# Patient Record
Sex: FEMALE | Race: BLACK OR AFRICAN AMERICAN | NOT HISPANIC OR LATINO | URBAN - METROPOLITAN AREA
[De-identification: names, ages, dates, MRNs, and addresses within clinical notes are randomized per-mention and may not be internally consistent; named-entity substitution may affect disease eponyms.]

---

## 2022-05-12 ENCOUNTER — EMERGENCY (EMERGENCY)
Facility: HOSPITAL | Age: 47
LOS: 0 days | Discharge: HOME | End: 2022-05-12
Attending: EMERGENCY MEDICINE
Payer: MEDICAID

## 2022-05-12 VITALS
TEMPERATURE: 98 F | HEART RATE: 58 BPM | DIASTOLIC BLOOD PRESSURE: 81 MMHG | RESPIRATION RATE: 18 BRPM | OXYGEN SATURATION: 99 % | SYSTOLIC BLOOD PRESSURE: 124 MMHG

## 2022-05-12 VITALS
TEMPERATURE: 98 F | DIASTOLIC BLOOD PRESSURE: 73 MMHG | OXYGEN SATURATION: 99 % | HEART RATE: 70 BPM | RESPIRATION RATE: 18 BRPM | SYSTOLIC BLOOD PRESSURE: 140 MMHG | WEIGHT: 199.96 LBS | HEIGHT: 71 IN
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
## 2022-05-12 DIAGNOSIS — Z23 ENCOUNTER FOR IMMUNIZATION: ICD-10-CM

## 2022-05-12 DIAGNOSIS — S51.831A PUNCTURE WOUND WITHOUT FOREIGN BODY OF RIGHT FOREARM, INITIAL ENCOUNTER: ICD-10-CM

## 2022-05-12 DIAGNOSIS — S41.111A LACERATION WITHOUT FOREIGN BODY OF RIGHT UPPER ARM, INITIAL ENCOUNTER: ICD-10-CM
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
## 2022-05-12 PROCEDURE — 73090 X-RAY EXAM OF FOREARM: CPT | Mod: 26,RT

## 2022-05-12 PROCEDURE — 99284 EMERGENCY DEPT VISIT MOD MDM: CPT

## 2022-05-12 RX ORDER — RABIES VACC, HUMAN DIPLOID/PF 2.5 UNIT
1 VIAL (EA) INTRAMUSCULAR ONCE
Refills: 0 | Status: COMPLETED | OUTPATIENT
Start: 2022-05-12 | End: 2022-05-12

## 2022-05-12 RX ORDER — TETANUS TOXOID, REDUCED DIPHTHERIA TOXOID AND ACELLULAR PERTUSSIS VACCINE, ADSORBED 5; 2.5; 8; 8; 2.5 [IU]/.5ML; [IU]/.5ML; UG/.5ML; UG/.5ML; UG/.5ML
0.5 SUSPENSION INTRAMUSCULAR ONCE
Refills: 0 | Status: COMPLETED | OUTPATIENT
Start: 2022-05-12 | End: 2022-05-12

## 2022-05-12 RX ORDER — HUMAN RABIES VIRUS IMMUNE GLOBULIN 150 [IU]/ML
1800 INJECTION, SOLUTION INTRAMUSCULAR ONCE
Refills: 0 | Status: COMPLETED | OUTPATIENT
Start: 2022-05-12 | End: 2022-05-12

## 2022-05-12 RX ADMIN — Medication 1 MILLILITER(S): at 22:37

## 2022-05-12 RX ADMIN — TETANUS TOXOID, REDUCED DIPHTHERIA TOXOID AND ACELLULAR PERTUSSIS VACCINE, ADSORBED 0.5 MILLILITER(S): 5; 2.5; 8; 8; 2.5 SUSPENSION INTRAMUSCULAR at 22:39

## 2022-05-12 RX ADMIN — HUMAN RABIES VIRUS IMMUNE GLOBULIN 1800 UNIT(S): 150 INJECTION, SOLUTION INTRAMUSCULAR at 22:37

## 2022-05-12 NOTE — ED ADULT NURSE NOTE - NS_SISCREENINGSR_GEN_ALL_ED
FAMILY HISTORY:  Mother  Still living? Unknown  FHx: diabetes mellitus, Age at diagnosis: Age Unknown  FHx: hypertension, Age at diagnosis: Age Unknown     Negative

## 2022-05-12 NOTE — ED PROVIDER NOTE - PATIENT PORTAL LINK FT
You can access the FollowMyHealth Patient Portal offered by [facility] by registering at the following website: [email or web address]. By joining [employer]’s FollowMyHealth portal, you will also be able to view your health information using other applications (apps) compatible with our system.

## 2022-05-12 NOTE — ED ADULT NURSE NOTE - IS THE PATIENT ABLE TO BE SCREENED?
Yes Continue medications as instructed, follow up with PMD, cardiology and neurology. Return to ED for worsening complaints. Follow up with outpatient drug rehab program for opioid dependence.
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
## 2022-05-12 NOTE — ED ADULT TRIAGE NOTE - WILL THE PATIENT ACCEPT THE PFIZER COVID-19 VACCINE IF ELIGIBLE AND IT IS AVAILABLE?
Per RF- he filled med for her. She does have a follow up with [facility].  Serena Chauhan, [facility]     No

## 2022-05-12 NOTE — ED PROVIDER NOTE - PHYSICAL EXAMINATION
Burks:  General: No distress.  Mentation at baseline.   HEENT: WNL  Chest/Lungs: CTAB, No wheeze, No retractions, No increased work of breathing, Normal rate  Heart: S1S2 RRR, No M/R/G, Pules equal Bilaterally in upper and lower extremities distally  Abd: soft, NT/ND, No guarding, No rebound.  No hernias, no palpable masses.  Extrem: FROM in all joints, no significant edema noted, No ulcers.  Cap refil < 2sec. two puncture wounds to right forarm  Skin: No rash noted, warm dry.  Neuro:  Grossly normal.  No difficulty ambulating. No focal deficits.  Psychiatric: No evidence of delusions. No SI/HI.

## 2022-05-15 ENCOUNTER — EMERGENCY (EMERGENCY)
Facility: HOSPITAL | Age: 47
LOS: 0 days | Discharge: HOME | End: 2022-05-15
Payer: MEDICAID

## 2022-05-15 VITALS
DIASTOLIC BLOOD PRESSURE: 84 MMHG | TEMPERATURE: 98 F | HEART RATE: 72 BPM | WEIGHT: 201.94 LBS | SYSTOLIC BLOOD PRESSURE: 136 MMHG | OXYGEN SATURATION: 98 % | RESPIRATION RATE: 14 BRPM | HEIGHT: 71 IN

## 2022-05-15 DIAGNOSIS — Z29.14 ENCOUNTER FOR PROPHYLACTIC RABIES IMMUNE GLOBIN: ICD-10-CM

## 2022-05-15 PROCEDURE — 99283 EMERGENCY DEPT VISIT LOW MDM: CPT

## 2022-05-15 RX ORDER — RABIES VACC, HUMAN DIPLOID/PF 2.5 UNIT
1 VIAL (EA) INTRAMUSCULAR ONCE
Refills: 0 | Status: COMPLETED | OUTPATIENT
Start: 2022-05-15 | End: 2022-05-15

## 2022-05-15 RX ADMIN — Medication 1 MILLILITER(S): at 10:45

## 2022-05-15 NOTE — ED PROVIDER NOTE - CLINICAL SUMMARY MEDICAL DECISION MAKING FREE TEXT BOX
45 y/o no pmhx presents for second follow-up of rabies vaccine. patient with healing puncture wound from dog bite to right forearm. denies fever, chills, new swelling, decreased range of motion. no other complaints. -- patient is well appearing. will order rabies vaccine and follow-up for 3rd vaccine on 5/19/22

## 2022-05-15 NOTE — ED PROVIDER NOTE - PATIENT PORTAL LINK FT
You can access the FollowMyHealth Patient Portal offered by [facility] by registering at the following website: [email or web address]. By joining [employer]’s FollowMyHealth portal, you will also be able to view your health information using other applications (apps) compatible with our system.

## 2022-05-15 NOTE — ED ADULT NURSE NOTE - OBJECTIVE STATEMENT
46yf presents for follow3 up rabies vaccine. States she was bit by a dog on 5/12/22, received first shot the same day. Reports some pain to right forearm, 6/10. Wound clean and intact. No infection, drainage, warmth. Aox4. Ambulatory. Spont breathing on room air. NAD

## 2022-05-15 NOTE — ED PROVIDER NOTE - PHYSICAL EXAMINATION
Gen: Awake, Alert, NAD  Head:  NC/AT  Eyes:  PERRL, EOMI, Conjunctiva pink, lids normal, no scleral icterus  ENT: OP clear, no exudates, moist mucus membranes  Neck: supple, nontender, no meningismus, no JVD, trachea midline  Cardiac/CV:  S1 S2, RRR, no M/G/R  Respiratory/Pulm:  CTAB, good air movement, normal resp effort, no wheezes/stridor/retractions/rales/rhonchi  Gastrointestinal/Abdomen:  Soft, non tender , nondistended, +BS, no rebound/guarding  Back:  no CVAT,  no MLT  Ext:  warm, well perfused, moving all extremities spontaneously, no peripheral edema, distal pulses intact  Skin: healing puncture wound to posterior aspect of arm. mild erythema, no swelling. full ROM   Neuro:  AAOx3, sensation intact, motor 5/5 x 4 extremities, normal gait, speech clear

## 2022-05-15 NOTE — ED PROVIDER NOTE - NSFOLLOWUPINSTRUCTIONS_ED_ALL_ED_FT
Advance activity as tolerated.  Continue all previously prescribed medications as directed unless otherwise instructed.  Follow up with your primary care physician in 48-72 hours- bring copies of your results.  Return to the ER for worsening or persistent symptoms, and/or ANY NEW OR CONCERNING SYMPTOMS. If you have issues obtaining follow up, please call: [phone] to obtain a doctor or specialist who takes your insurance in your area.  You may call [phone] to make an appointment with the internal medicine clinic.     Return to the ED for your next vaccine on 5/19/22

## 2022-05-15 NOTE — ED PROVIDER NOTE - PROGRESS NOTE DETAILS
Supervising Statement (CAMERON Patrick): I have personally seen and examined this patient.  I have fully participated in the care of this patient. I have reviewed all pertinent clinical information, including history, physical exam, plan and the ACP Fellow's note and agree except as noted.

## 2022-05-15 NOTE — ED PROVIDER NOTE - OBJECTIVE STATEMENT
45 y/o no pmhx presents for second follow-up of rabies vaccine. patient with healing puncture wound from dog bite to right forearm. denies fever, chills, new swelling, decreased range of motion. no other complaints.

## 2022-05-19 ENCOUNTER — EMERGENCY (EMERGENCY)
Facility: HOSPITAL | Age: 47
LOS: 0 days | Discharge: HOME | End: 2022-05-19
Attending: STUDENT IN AN ORGANIZED HEALTH CARE EDUCATION/TRAINING PROGRAM
Payer: MEDICAID

## 2022-05-19 VITALS
WEIGHT: 201.06 LBS | RESPIRATION RATE: 18 BRPM | OXYGEN SATURATION: 99 % | SYSTOLIC BLOOD PRESSURE: 126 MMHG | TEMPERATURE: 98 F | DIASTOLIC BLOOD PRESSURE: 82 MMHG | HEART RATE: 63 BPM | HEIGHT: 71 IN

## 2022-05-19 DIAGNOSIS — Z20.3 CONTACT WITH AND (SUSPECTED) EXPOSURE TO RABIES: ICD-10-CM

## 2022-05-19 DIAGNOSIS — Z23 ENCOUNTER FOR IMMUNIZATION: ICD-10-CM

## 2022-05-19 PROBLEM — J45.909 UNSPECIFIED ASTHMA, UNCOMPLICATED: Chronic | Status: ACTIVE | Noted: 2022-05-15

## 2022-05-19 PROCEDURE — 99282 EMERGENCY DEPT VISIT SF MDM: CPT

## 2022-05-19 RX ORDER — RABIES VACC, HUMAN DIPLOID/PF 2.5 UNIT
1 VIAL (EA) INTRAMUSCULAR ONCE
Refills: 0 | Status: DISCONTINUED | OUTPATIENT
Start: 2022-05-19 | End: 2022-05-19

## 2022-05-19 NOTE — ED PROVIDER NOTE - NS ED ATTENDING STATEMENT MOD
This was a shared visit with the LILIAN. I reviewed and verified the documentation and independently performed the documented:

## 2022-05-19 NOTE — ED ADULT TRIAGE NOTE - GLASGOW COMA SCALE: SCORE, MLM
15 PO intake >75% meals in house. Denies any GI issues (nausea/vomiting/diarrhea/constipation.) Denies any chewing or swallowing difficulties at this time. NKFA. Pt with hx alcohol use. Discussed effects of purine and alcohol intake on gout flare. Also educated pt on heart failure, low Na diet (nutrition label reading, 2 mg limit, adding more fruits and vegetables.)    Pt reports intentional wt loss due to eating better at rehab facility after fall.

## 2022-05-19 NOTE — ED PROVIDER NOTE - PHYSICAL EXAMINATION
Gen: Awake, Alert, NAD  ENT: OP clear, no exudates, moist mucus membranes  Cardiac/CV:  S1 S2, RRR, no M/G/R  Respiratory/Pulm:  CTAB, good air movement, normal resp effort, no wheezes/stridor/retractions/rales/rhonchi  Ext:  warm, well perfused, moving all extremities spontaneously, no peripheral edema, distal pulses intact  Skin: intact, no rash  Neuro:  AAOx3, sensation intact, motor 5/5 x 4 extremities, normal gait, speech clear

## 2022-05-19 NOTE — ED PROVIDER NOTE - CLINICAL SUMMARY MEDICAL DECISION MAKING FREE TEXT BOX
45 y/o F pmhx asthma here for 3rd rabies vaccine after dog bite to right arm. patient is well appearing with no complaints. bite to right forearm healing well. denies fever, chills. -- will give 3rd dose and patient to return for last dose of series on 5/26

## 2022-05-19 NOTE — ED PROVIDER NOTE - OBJECTIVE STATEMENT
45 y/o F pmhx asthma here for 3rd rabies vaccine after dog bite to right arm. patient is well appearing with no complaints. bite to right forearm healing well. denies fever, chills.

## 2022-06-02 ENCOUNTER — EMERGENCY (EMERGENCY)
Facility: HOSPITAL | Age: 47
LOS: 0 days | Discharge: HOME | End: 2022-06-02
Attending: STUDENT IN AN ORGANIZED HEALTH CARE EDUCATION/TRAINING PROGRAM
Payer: MEDICAID

## 2022-06-02 VITALS
HEIGHT: 71 IN | SYSTOLIC BLOOD PRESSURE: 127 MMHG | RESPIRATION RATE: 18 BRPM | HEART RATE: 53 BPM | OXYGEN SATURATION: 98 % | WEIGHT: 145.06 LBS | TEMPERATURE: 98 F | DIASTOLIC BLOOD PRESSURE: 68 MMHG

## 2022-06-02 DIAGNOSIS — Z23 ENCOUNTER FOR IMMUNIZATION: ICD-10-CM

## 2022-06-02 PROCEDURE — 99283 EMERGENCY DEPT VISIT LOW MDM: CPT

## 2022-06-02 RX ORDER — RABIES VACC, HUMAN DIPLOID/PF 2.5 UNIT
1 VIAL (EA) INTRAMUSCULAR ONCE
Refills: 0 | Status: COMPLETED | OUTPATIENT
Start: 2022-06-02 | End: 2022-06-02

## 2022-06-02 RX ADMIN — Medication 1 MILLILITER(S): at 06:20

## 2022-06-02 NOTE — ED ADULT TRIAGE NOTE - IDEAL BODY WEIGHT(KG)
Stable. Currently using inhalers as prescribed.  She denies side effects.  Denies recent or current exacerbation.   Denies current shortness of breath, chest pain, or cough.  She is on oxygen therapy.       71

## 2022-06-02 NOTE — ED PROVIDER NOTE - OBJECTIVE STATEMENT
46F presenting with last dose of rabies vaccination. otherwise no other complaints. Denies any chest pain, abdominal pain, shortness of breath, nausea/vomiting, headaches, fevers, chills, diarrhea ,constipation, weakness, syncope, hematuria, dysuria, urinary symptoms, subjective neurological deficits.

## 2022-06-02 NOTE — ED ADULT TRIAGE NOTE - BEFAST EYES
Normocytic, normochromic.  No baseline on file but has been told in the past that she was anemic and has taken iron supplements in the past.  She denies obvious observed bleeding.  Low Iron studies. Start ferrous sulfate   No

## 2022-06-02 NOTE — ED PROVIDER NOTE - PHYSICAL EXAMINATION
VITAL SIGNS: I have reviewed nursing notes and confirm.   GEN: Well-developed; well-nourished; in no acute distress. Speaking full sentences.  SKIN: Warm, pink, no rash, no diaphoresis, no cyanosis, well perfused. EXCEPT: (+) healed right forearm circular scarring x 2 lesions. no cellulitis, no erythema, no swelling, no lymphangitis.  HEAD: Normocephalic; atraumatic. No scalp lacerations, no abrasions.  NECK: Supple; non tender.   EYES: Pupils 3mm equal, round, reactive to light and accomodation, conjunctiva and sclera clear. Extra-ocular movements intact bilaterally.  ENT: No nasal discharge; airway clear. Trachea is midline. ORAL: No oropharyngeal exudates or erythema. Normal dentition.  CV: Regular rate and rhythm. S1, S2 normal; no murmurs, gallops, or rubs. No lower extremity pitting edema bilaterally. Capillary refill < 2 seconds throughout. Distal pulses intact 2+ throughout.  RESP: CTA bilaterally. No wheezes, rales, or rhonchi.   ABD: Normal bowel sounds, soft, non-distended, non-tender, no rebound, no guarding, no rigidity, no hepatosplenomegaly. No CVA tenderness bilaterally.  MSK: Normal range of motion and movement of all 4 extremities. No joint or muscular pain throughout. No clubbing.   BACK: No thoracolumbar midline or paravertebral tenderness. No step-offs or obvious deformities.  NEURO: Alert & oriented x 3, Grossly unremarkable. Sensory and motor intact throughout. No focal deficits. Gait: Fluid. Normal speech and coordination.   PSYCH: Cooperative, appropriate.

## 2022-06-02 NOTE — ED PROVIDER NOTE - PATIENT PORTAL LINK FT
You can access the FollowMyHealth Patient Portal offered by [facility] by registering at the following website: [email or web address]. By joining [employer] [facility]’s FollowMyHealth portal, you will also be able to view your health information using other applications (apps) compatible with our system.

## 2023-04-08 NOTE — ED PROVIDER NOTE - PATIENT PORTAL LINK FT
You can access the FollowMyHealth Patient Portal offered by [facility] by registering at the following website: [email or web address]. By joining [employer]’s FollowMyHealth portal, you will also be able to view your health information using other applications (apps) compatible with our system. Patient requests all Lab, Cardiology, and Radiology Results on their Discharge Instructions

## 2024-04-24 NOTE — ED PROVIDER NOTE - NSDCPRINTRESULTS_ED_ALL_ED
Plan:  Az Long, it was nice seeing you today  Increase Concerta to 27mg daily. Monitor closely for any worsening symptoms  Start Rozerem 8mg nightly within 30 minutes of bed for sleep  Start nicotine lozenges 4mg daily. Take 1 lozenge every 1-2 hours as needed for cravings. Max 5 lozenge every 6hrs or 20 lozenge in day. Monitor for side effects  Continue all other medications  Make sure to take your BP medication daily  Follow up in 6 weeks      -Please take medications as prescribed  -Refrain from alcohol or drug use  -Seek emergency help via the emergency and/or calling 911 should symptoms become severe, worsen, or with other concerning symptoms.Go immediately to the emergency room and/or call 911 with any suicidal or homicidal ideations or if audio/visual hallucinations develop.   -Contact office with any questions or concerns.      Crisis phone numbers:  988-national suicide hotline, call or text  [facility] [phone].  [facility] [phone]  [facility] [phone].  [facility] [phone].  [facility] [phone].  [facility] [phone].   
Patient requests all Lab, Cardiology, and Radiology Results on their Discharge Instructions

## 2024-08-15 NOTE — ED ADULT TRIAGE NOTE - TEMPERATURE IN CELSIUS (DEGREES C)
Progress Notes  Jayda Villalba MD (Physician)  Gynecologic Oncology  Expand All Collapse All  GYNECOLOGIC ONCOLOGY  Dr. Jayda Sofia      Chief Complaint   Patient presents with    Consultation         INITIAL OFFICE VISIT   5/28/2024     Bianca De La Paz is being seen at the kind request of Aleshia Gibbs MD for further evaluation and treatment recommendations for Endometrioid adenocarcinoma, FIGO grade 1.  She is unaccompanied today.     PCP: Tina Mcleod MD     History of Present Illness:  Bianca De La Paz is a 49 year old female who presented to the emergency department with persistent heavy vaginal bleeding x's 10 days. Patient has irregular cycles due to PCOS.     5/13/24 US pelvis 11.6 cm:  1.  Abnormally thickened endometrium measuring 26 mm with some internal  flow on color Doppler imaging. Recommend gynecologic consultation for  further evaluation.  2.  Right ovarian cyst measuring 4 cm. This likely contributes to the  asymmetric volumes between the ovaries, which are only visualized  transabdominally.  3.  A few complex appearing nabothian cysts.     5/15/24 pathology:  Endometrium, biopsy:   -Endometrioid adenocarcinoma, FIGO grade 1 (Immunohistochemical stain for P53 show wild-type staining pattern. Microsatellite instability (MSI) testing is ordered and will be reported as an addendum.)     She now presents for consultation.  She states that she has been diagnosed with PCOS.  One week ago she had significantly heavy bleeding and was started on a norethindrone 3 times a day.  She attempted to go down to twice a day but was still bleeding.  She states that she only gets menses a couple of times a year secondary to the PCOS.  She states that without the medication she does soak through a pad.  She has had some bloating which started when the bleeding started.  She does have low pelvic cramping that is constant.  She also does have some hot flashes as well as  insomnia and anxiety.  She also has some fatigue.     Distress Thermometer:  3  Reproductive History  Last Pap Smear: 16  History of Abnormal Pap Smears:Denies  Colonoscopy: Has not had yet           OB History    Para Term  AB Living   1 1 0 1 0 1   SAB IAB Ectopic Molar Multiple Live Births    0 0 0 0 0 1         REVIEW OF SYSTEMS      A 12 point review of systems is performed and reviewed with pertinent findings as noted above.     PAST MEDICAL, SURGICAL, FAMILY AND SOCIAL HISTORY      History - past medical        Past Medical History:   Diagnosis Date    Asthma (CMD)      Chronic coughing 10/28/2016    Essential (primary) hypertension      Simple chronic bronchitis  (CMD) 10/19/2016            History - past surgical         Past Surgical History:   Procedure Laterality Date    Hand surgery Left       cyst removal    Knee scope,diagnostic        Removal gallbladder        Tonsillectomy                History - family         Family History   Problem Relation Age of Onset    Osteoarthritis Mother              History - social   Social History            Socioeconomic History    Marital status: /Civil Union       Spouse name: Not on file    Number of children: Not on file    Years of education: Not on file    Highest education level: Not on file   Occupational History    Not on file   Tobacco Use    Smoking status: Never       Passive exposure: Never    Smokeless tobacco: Never   Vaping Use    Vaping status: never used   Substance and Sexual Activity    Alcohol use: Yes       Comment: Occasionally    Drug use: No    Sexual activity: Not Currently       Partners: Male   Other Topics Concern    Not on file   Social History Narrative    Not on file      Social Determinants of Health      Financial Resource Strain: Not on file   Food Insecurity: Not on file   Transportation Needs: Not on file   Physical Activity: Not on file   Stress: Not on file   Social Connections: Not on file    Interpersonal Safety: Not on file            MEDICATIONS AND ALLERGIES           Current Medications     B COMPLEX VITAMINS (VITAMIN B COMPLEX) TABLET    Take 1 tablet by mouth daily.     CANNABIDIOL (CBD) OIL    cbd     CARVEDILOL (COREG) 6.25 MG TABLET    TAKE ONE TABLET BY MOUTH TWICE A DAY WITH MEALS     CHOLECALCIFEROL (VITAMIN D-3) 5000 UNITS TABS    Take 1 tablet by mouth daily.     CYCLOBENZAPRINE (FLEXERIL) 10 MG TABLET    Take 1/2- 1 tablet by mouth three times daily as needed for muscle spasms.     DICLOFENAC (VOLTAREN) 1 % GEL    Apply 2 g topically 4 times daily as needed (arthritis pain).     HYDROCHLOROTHIAZIDE (MICROZIDE) 12.5 MG CAPSULE    Take 1 capsule by mouth daily.     LOSARTAN (COZAAR) 50 MG TABLET    TAKE ONE TABLET BY MOUTH DAILY     NALTREXONE 5 MG CAPSULE    Take 1 capsule by mouth daily.     NORETHINDRONE (AYGESTIN) 5 MG TABLET    Take 1 tablet by mouth in the morning and 1 tablet in the evening.              ALLERGIES:   Allergen Reactions    Morphine      Robitussin Night-Time Cold [Cvs Night Time]      Sudafed [Pseudoephedrine]                 PHYSICAL EXAM      Multiple vitals with pain scale       Vitals:     05/28/24 0755   BP: (!) 142/84   Pulse: 86   Resp: 16   Temp: 98.2 °F (36.8 °C)   Weight: 118.6 kg (261 lb 8 oz)   Height: 5' 4\" (1.626 m)   LMP: 05/01/2024         Body mass index is 44.89 kg/m².     Constitutional: she appears well-developed and well-nourished, no apparent distress.   HEENT: normocephalic and atraumatic, no masses, lesions or abnormalities, conjunctivae, eye lids and EOMs are normal.   Neck: normal range of motion with no tracheal deviation or asymmetry, thyroid is not enlarged.  Lungs: normal respiratory effort and no accessory muscle usage.  Cardiovascular: regular rate and rhythm, S1, S2 normal  Abdomen: soft, non-tender, nondistended and no palpable masses or hepatosplenomegaly.  Lymphatics:  no inguinal lymphadenopathy.  Neurologic: Mental  Status/Orientation normal and alert and Cranial Nerves CN 2 - 12 grossly intact.  Extremities: normal lower extremities, no edema noted bilaterally and no cyanosis or clubbing bilaterally.  Musculoskeletal: coordination and gait normal, appropriate muscle strength and tone.  Skin: skin is warm and dry, no rash or skin lesions noted, she is not diaphoretic.  Psychiatric: she is alert and oriented to person, place, and time, she has a normal mood and affect, her behavior is normal, judgment and thought content normal.  Gyn: deferred                                                                       Nursing note and vitals reviewed.     DATA REVIEW AND ORDERS PLACED   Pathology and Ultrasound reviewed.      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 From the biopsy, we have determined the grade of the cancer within her uterus.  Grades range from 1-3, with 1 being the closest to normal; 3 being the most changed.  The Stage is where in the body the cancer cells are.  This is why we do the surgery, to determine the stage.     Surgery would entail removal of the uterus, cervix, both ovaries and fallopian tubes, as well as the sentinel lymph nodes, which are the first lymph nodes that the uterus drains to.  If the sentinel lymph nodes are negative, then there is a high likelihood that the other lymph nodes are negative as well. These lymph nodes can be at the pelvic or the para aortic areas.  We did discuss keeping her ovary secondary to her age.  However, as she is PCOS, and there is an ovarian cyst on the right, she would like these removed.  I think this is reasonable.     We discussed the route of the surgery, which would be  robotically.  We discussed the risks, benefits, and alternatives of robotic surgery.  If there are any concerns with completing the surgery robotically, we would then proceed to an open technique.       Alternatively, we discussed that sometimes this type of cancer can be treated hormonally, in patients who cannot safely perform surgery, or in those trying to maintain fertility.  This is done with a medication called progesterone.  We can also use this to treat patients while we are waiting to schedule surgery.   She is already on norethindrone 3 times a day.  We discussed trying to get back to twice a day or even once a day.  She will start trying to wean this off.  She can continue the norethindrone until the surgery.     In the end, Bianca De La Paz would like to proceed with surgery, and utilize norethindrone while waiting to schedule the surgery.  We will move toward surgical pre-operative instructions and planning.       The risks of surgery were discussed in great detail.  This included reviewing the [facility] Gynecologic Oncology Informed Consent in detail.  She acknowledges understanding of all the information presented and was given a copy of this to take with her for further review.  She grants both verbal and written permission to proceed with surgery. She will have the tests as noted above. She was given a copy of my professional card and encouraged to call me should she have any questions or concerns.     At the end of the visit the patient stated an understanding and agreement with the above stated plan of care. All of her questions were answered to her satisfaction. I've encouraged her to call the office with any further issues, questions, or concerns.     Aleshia Gibbs MD, thank you so much for allowing me the opportunity to assist you in the care of this arden lady. Please contact me if you have any questions or concerns. I look forward to being of further service to you  and will continue to keep you informed of any and all subsequent developments in her care.       60 minutes were spent in preparation of the visit, review of patient notes, imaging and results; discussion of patient presentation, physical exam, and surgical teaching and discussion of surgery with greater than 50% in consultation and treatment planning.         Jayda Anaya MD.  [facility] Gynecologic Oncology      [address] [place] [address], [address]  Phone: [phone]               Interval H&P   I reviewed the H&P, I examined the patient, and there are no changes in the patient's condition. Risks and Benefits discussed.     Proceed with planned surgery as documented on the consent form.  Robotic assisted removal of uterus, cervix, both ovaries and fallopian tubes; removal of lymph nodes; possible open surgery    Jayda Anaya MD.  [facility] Gynecologic Oncology     [address] [place] [address], [address]  Phone: [phone]    1:14 PM     36.7
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
